# Patient Record
Sex: FEMALE | Race: WHITE | NOT HISPANIC OR LATINO | URBAN - METROPOLITAN AREA
[De-identification: names, ages, dates, MRNs, and addresses within clinical notes are randomized per-mention and may not be internally consistent; named-entity substitution may affect disease eponyms.]

---

## 2017-01-17 DIAGNOSIS — F41.9 ANXIETY: Primary | ICD-10-CM

## 2017-01-17 DIAGNOSIS — M26.609 TMJ (TEMPOROMANDIBULAR JOINT SYNDROME): ICD-10-CM

## 2017-01-17 DIAGNOSIS — R51.9 HEADACHE, UNSPECIFIED HEADACHE TYPE: ICD-10-CM

## 2017-01-18 NOTE — TELEPHONE ENCOUNTER
Routing refill request to provider for review/approval because:  Labs out of range:  PHQ-9 greater than 4.      Leighann Ash RN

## 2017-01-18 NOTE — TELEPHONE ENCOUNTER
SERTRALINE HCL 50MG TABS     Last Written Prescription Date: 10/25/2016  Last Fill Quantity: 45, # refills: 1  Last Office Visit with [facility] primary care provider:  10/25/2016        Last PHQ-9 score on record=   PHQ-9 SCORE 8/22/2016   Total Score -   Total Score 7

## 2017-01-18 NOTE — TELEPHONE ENCOUNTER
left voicemail message for patient to contact TC line to schedule.  NTBS:med check/yearly physical/pap  Candi POTTER

## 2017-01-18 NOTE — TELEPHONE ENCOUNTER
Script refill faxed. Remind pt to do follow up for med check since she is due.  Per chart review also yearly physical/ pap etc ,

## 2017-01-23 ENCOUNTER — OFFICE VISIT (OUTPATIENT)
Dept: FAMILY MEDICINE | Facility: CLINIC | Age: 27
End: 2017-01-23
Payer: COMMERCIAL

## 2017-01-23 VITALS
HEIGHT: 64 IN | DIASTOLIC BLOOD PRESSURE: 70 MMHG | WEIGHT: 148.8 LBS | HEART RATE: 68 BPM | SYSTOLIC BLOOD PRESSURE: 110 MMHG | RESPIRATION RATE: 9 BRPM | TEMPERATURE: 98.9 F | BODY MASS INDEX: 25.4 KG/M2

## 2017-01-23 DIAGNOSIS — R79.0 LOW FERRITIN: ICD-10-CM

## 2017-01-23 DIAGNOSIS — F41.9 ANXIETY: ICD-10-CM

## 2017-01-23 DIAGNOSIS — Z00.00 ROUTINE GENERAL MEDICAL EXAMINATION AT A HEALTH CARE FACILITY: Primary | ICD-10-CM

## 2017-01-23 DIAGNOSIS — Z13.6 CARDIOVASCULAR SCREENING; LDL GOAL LESS THAN 160: ICD-10-CM

## 2017-01-23 DIAGNOSIS — Z23 NEED FOR HPV VACCINE: ICD-10-CM

## 2017-01-23 DIAGNOSIS — Z23 NEED FOR VACCINATION: ICD-10-CM

## 2017-01-23 DIAGNOSIS — R51.9 HEADACHE, UNSPECIFIED HEADACHE TYPE: ICD-10-CM

## 2017-01-23 DIAGNOSIS — E55.9 VITAMIN D DEFICIENCY: ICD-10-CM

## 2017-01-23 DIAGNOSIS — M26.609 TMJ (TEMPOROMANDIBULAR JOINT SYNDROME): ICD-10-CM

## 2017-01-23 PROCEDURE — 90649 4VHPV VACCINE 3 DOSE IM: CPT | Performed by: FAMILY MEDICINE

## 2017-01-23 PROCEDURE — 99395 PREV VISIT EST AGE 18-39: CPT | Mod: 25 | Performed by: FAMILY MEDICINE

## 2017-01-23 PROCEDURE — 99212 OFFICE O/P EST SF 10 MIN: CPT | Mod: 25 | Performed by: FAMILY MEDICINE

## 2017-01-23 PROCEDURE — 90471 IMMUNIZATION ADMIN: CPT | Performed by: FAMILY MEDICINE

## 2017-01-23 ASSESSMENT — PATIENT HEALTH QUESTIONNAIRE - PHQ9: 5. POOR APPETITE OR OVEREATING: NOT AT ALL

## 2017-01-23 ASSESSMENT — ANXIETY QUESTIONNAIRES
6. BECOMING EASILY ANNOYED OR IRRITABLE: NOT AT ALL
1. FEELING NERVOUS, ANXIOUS, OR ON EDGE: SEVERAL DAYS
3. WORRYING TOO MUCH ABOUT DIFFERENT THINGS: NOT AT ALL
5. BEING SO RESTLESS THAT IT IS HARD TO SIT STILL: NOT AT ALL
IF YOU CHECKED OFF ANY PROBLEMS ON THIS QUESTIONNAIRE, HOW DIFFICULT HAVE THESE PROBLEMS MADE IT FOR YOU TO DO YOUR WORK, TAKE CARE OF THINGS AT HOME, OR GET ALONG WITH OTHER PEOPLE: NOT DIFFICULT AT ALL
2. NOT BEING ABLE TO STOP OR CONTROL WORRYING: NOT AT ALL
GAD7 TOTAL SCORE: 1
7. FEELING AFRAID AS IF SOMETHING AWFUL MIGHT HAPPEN: NOT AT ALL

## 2017-01-23 NOTE — PROGRESS NOTES
SUBJECTIVE:     CC: Claudette Pena is an 27 year old woman who presents for preventive health visit.     Healthy Habits:    Do you get at least three servings of calcium containing foods daily (dairy, green leafy vegetables, etc.)? yes    Amount of exercise or daily activities, outside of work: 2 day(s) per week    Problems taking medications regularly No    Medication side effects: No    Have you had an eye exam in the past two years? no    Do you see a dentist twice per year? yes    Do you have sleep apnea, excessive snoring or daytime drowsiness?no        PROBLEMS TO ADD ON...  Follow up on HA/ anxiety med's- her HA have improved a lot since she ha stopped taking oc med's multiple times. Zoloft has helped a lot with mod, not feeling as stressed. Now. Her TMJ is much better. No problem taking med's, so comfortable continuing and wants refill.   She also wants her vit-d and ferritin level bc it was low. She has stopped taking her vit-d last couple of months not taking any iron supplement/ her periods are normal and regular,  But wants labs just to make sure she is ok     Today's PHQ-2 Score:   PHQ-2 ( 1999 [employer]) 8/17/2016 5/16/2016   Q1: Little interest or pleasure in doing things 0 0   Q2: Feeling down, depressed or hopeless 0 0   PHQ-2 Score 0 0       Abuse: Current or Past(Physical, Sexual or Emotional)- NO  Do you feel safe in your environment - YES    Social History   Substance Use Topics     Smoking status: Never Smoker      Smokeless tobacco: Never Used     Alcohol Use: No     The patient does not drink >3 drinks per day nor >7 drinks per week.    Recent Labs   Lab Test  11/17/15   1607  09/20/13   0817  05/10/10   0738   CHOL   --   185  191   HDL   --   67  69   LDL  124  107  108   TRIG   --   59  69   CHOLHDLRATIO   --   2.8  2.8       Reviewed orders with patient.  Reviewed health maintenance and updated orders accordingly - Yes    Mammo Decision Support:  Mammogram not appropriate for this patient  based on age.    Pertinent mammograms are reviewed under the imaging tab.  History of abnormal Pap smear: NO - age 21-29 PAP every 3 years recommended  All Histories reviewed and updated in Epic.  Past Medical History   Diagnosis Date     Anxiety 12/9/2014     Vitamin D deficiency 11/11/2010     TMJ (temporomandibular joint syndrome) 11/5/2014        ROS:  C: NEGATIVE for fever, chills, change in weight  I: NEGATIVE for worrisome rashes, moles or lesions  E: NEGATIVE for vision changes or irritation  ENT: NEGATIVE for ear, mouth and throat problems  R: NEGATIVE for significant cough or SOB  B: NEGATIVE for masses, tenderness or discharge  CV: NEGATIVE for chest pain, palpitations or peripheral edema  GI: NEGATIVE for nausea, abdominal pain, heartburn, or change in bowel habits  : NEGATIVE for unusual urinary or vaginal symptoms. Periods are regular.  M: NEGATIVE for significant arthralgias or myalgia  N: NEGATIVE for weakness, dizziness or paresthesias  NEURO: NEGATIVE for weakness, dizziness or paresthesias and HA, improved as per HPI   ENDOCRINE: NEGATIVE for temperature intolerance, skin/hair changes  HEME/ALLERGY/IMMUNE: NEGATIVE for bleeding problems  PSYCHIATRIC: NEGATIVE for changes in mood or affect and improved on med's, as per HPI     Problem list, Medication list, Allergies, and Medical/Social/Surgical histories reviewed in [facility] and updated as appropriate.  OBJECTIVE:     There were no vitals taken for this visit.  EXAM:  GENERAL: healthy, alert and no distress  EYES: Eyes grossly normal to inspection, PERRL and conjunctivae and sclerae normal  HENT: ear canals and TM's normal, nose and mouth without ulcers or lesions  NECK: no adenopathy, no asymmetry, masses, or scars and thyroid normal to palpation  RESP: lungs clear to auscultation - no rales, rhonchi or wheezes  BREAST: normal without masses, tenderness or nipple discharge and no palpable axillary masses or adenopathy  CV: regular rate and rhythm,  normal S1 S2, no S3 or S4, no murmur, click or rub, no peripheral edema and peripheral pulses strong  ABDOMEN: soft, nontender, no hepatosplenomegaly, no masses and bowel sounds normal   (female): deferred, per pt   RECTAL: deferred  MS: no gross musculoskeletal defects noted, no edema  SKIN: no suspicious lesions or rashes  NEURO: Normal strength and tone, mentation intact and speech normal  PSYCH: mentation appears normal, affect normal/bright    ASSESSMENT/PLAN:     (Z00.00) Routine general medical examination at a health care facility  (primary encounter diagnosis)  Comment: she decline pap/ pelvic   Plan: Glucose, Lipid panel reflex to direct LDL            (F41.9) Anxiety  Comment: doing better on 75 mg dose   Plan: sertraline (ZOLOFT) 50 MG tablet          Discussed cares, talked about signs and symptoms of anxiety/ depression and treatment options. Willing to continue meds for now.  Pros/ cons of med's discussed . spent sometimes counseling patient. Follow up in 4-6  months, sooner if problem.       (R51) Headache, unspecified headache type  Comment: improved and doing better on current med's.   Plan: sertraline (ZOLOFT) 50 MG tablet            (M26.609) TMJ (temporomandibular joint syndrome)  Comment: improved   Plan: sertraline (ZOLOFT) 50 MG tablet, continue with same med's     (Z13.6) CARDIOVASCULAR SCREENING; LDL GOAL LESS THAN 160  Comment:   Plan: Lipid panel reflex to direct LDL            (E55.9) Vitamin D deficiency  Comment:   Plan: Vitamin D Deficiency            (R79.0) Low ferritin  Comment:   Plan: CBC with platelets, Ferritin            (Z23) Need for HPV vaccine  Comment:   Plan: she wish to start series     COUNSELING:   Reviewed preventive health counseling, as reflected in patient instructions       Regular exercise       Healthy diet/nutrition       Vision screening      Patient expressed understanding and agreement with treatment plan. All patient's questions were answered, will let  "me know if has more later.  Medications: Rx's: Reviewed the potential side effects/complications of medications prescribed.        reports that she has never smoked. She has never used smokeless tobacco.    Estimated body mass index is 24.88 kg/(m^2) as calculated from the following:    Height as of 4/6/16: 5' 4\" (1.626 m).    Weight as of 10/25/16: 145 lb (65.772 kg).       HCM issues discussed. Diet/ exercise discussed. Check labs , call patiens with results. follow up as needed.         Counseling Resources:  ATP IV Guidelines  Pooled Cohorts Equation Calculator  Breast Cancer Risk Calculator  FRAX Risk Assessment  ICSI Preventive Guidelines  Dietary Guidelines for Americans, 2010  USDA's MyPlate  ASA Prophylaxis  Lung CA Screening    Eloise De La Torre MD  [facility]  "

## 2017-01-23 NOTE — MR AVS SNAPSHOT
After Visit Summary   1/23/2017    Claudette Pena    MRN: [id number]           Patient Information     Date Of Birth          1990        Visit Information        Provider Department      1/23/2017 4:00 PM Eloise De La Torre MD [facility]        Today's Diagnoses     Routine general medical examination at a health care facility    -  1     Anxiety         Headache, unspecified headache type         TMJ (temporomandibular joint syndrome)         CARDIOVASCULAR SCREENING; LDL GOAL LESS THAN 160         Vitamin D deficiency         Low ferritin         Need for HPV vaccine           Care Instructions      Preventive Health Recommendations  Female Ages 18 to 25     Yearly exam:     See your health care provider every year in order to  o Review health changes.   o Discuss preventive care.    o Review your medicines if your doctor has prescribed any.      You should be tested each year for STDs (sexually transmitted diseases).       After age 20, talk to your provider about how often you should have cholesterol testing.      Starting at age 21, get a Pap test every three years. If you have an abnormal result, your doctor may have you test more often.      If you are at risk for diabetes, you should have a diabetes test (fasting glucose).     Shots:     Get a flu shot each year.     Get a tetanus shot every 10 years.     Consider getting the shot (vaccine) that prevents cervical cancer (Gardasil).    Nutrition:     Eat at least 5 servings of fruits and vegetables each day.    Eat whole-grain bread, whole-wheat pasta and brown rice instead of white grains and rice.    Talk to your provider about Calcium and Vitamin D.     Lifestyle    Exercise at least 150 minutes a week each week (30 minutes a day, 5 days a week). This will help you control your weight and prevent disease.    Limit alcohol to one drink per day.    No smoking.     Wear sunscreen to prevent skin cancer.    See your dentist  "every six months for an exam and cleaning.        Follow-ups after your visit        Future tests that were ordered for you today     Open Future Orders        Priority Expected Expires Ordered    CBC with platelets Routine  9/23/2017 1/23/2017    Vitamin D Deficiency Routine  9/23/2017 1/23/2017    Ferritin Routine  9/23/2017 1/23/2017    Glucose Routine  9/23/2017 1/23/2017    Lipid panel reflex to direct LDL Routine  9/23/2017 1/23/2017            Who to contact     If you have questions or need follow up information about today's clinic visit or your schedule please contact [facility] [place] directly at [phone].  Normal or non-critical lab and imaging results will be communicated to you by MyChart, letter or phone within 4 business days after the clinic has received the results. If you do not hear from us within 7 days, please contact the clinic through [facility] or phone. If you have a critical or abnormal lab result, we will notify you by phone as soon as possible.  Submit refill requests through [employer] or call your pharmacy and they will forward the refill request to us. Please allow 3 business days for your refill to be completed.          Additional Information About Your Visit        MyChart Information     [employer] gives you secure access to your electronic health record. If you see a primary care provider, you can also send messages to your care team and make appointments. If you have questions, please call your primary care clinic.  If you do not have a primary care provider, please call [phone] and they will assist you.        Care EveryWhere ID     This is your Care EveryWhere ID. This could be used by other organizations to access your [facility] medical records  [id number]        Your Vitals Were     Pulse Temperature Respirations Height BMI (Body Mass Index) Last Period    68 98.9  F (37.2  C) (Tympanic) 9 5' 4\" (1.626 m) 25.53 kg/m2 01/07/2017       Blood Pressure from Last 3 " Encounters:   01/23/17 110/70   10/25/16 112/75   08/17/16 100/70    Weight from Last 3 Encounters:   01/23/17 148 lb 12.8 oz (67.495 kg)   10/25/16 145 lb (65.772 kg)   08/17/16 145 lb 12.8 oz (66.134 kg)                 Today's Medication Changes          These changes are accurate as of: 1/23/17  4:40 PM.  If you have any questions, ask your nurse or doctor.               These medicines have changed or have updated prescriptions.        Dose/Directions    sertraline 50 MG tablet   Commonly known as:  ZOLOFT   This may have changed:  See the new instructions.   Used for:  Anxiety, Headache, unspecified headache type   Changed by:  Eloise De La Torre MD        Dose:  75 mg   Take 1.5 tablets (75 mg) by mouth daily   Quantity:  125 tablet   Refills:  1            Where to get your medicines      These medications were [place] [facility] [place] [address] [place] [facility] [address]     Phone:  [phone]    - sertraline 50 MG tablet             Primary Care Provider Office Phone # Fax #    Eloise De La Torre [phone]       [address]  [place] [address]        Thank you!     Thank you for choosing [facility]  [place] your [place]. Our goal is always to provide you with excellent care. Hearing back from our patients is one way we can continue to improve our services. Please take a few minutes to complete the written survey that you may receive in the mail after your visit with us. Thank you!             Your Updated Medication List - Protect others around you: Learn how to safely use, store and throw away your medicines at www.disposemymeds.org.          This list is accurate as of: 1/23/17  4:40 PM.  Always use your most recent med list.                   Brand Name Dispense Instructions for use    Calcium Carb-Cholecalciferol 1000-800 MG-UNIT Tabs    CALCIUM 1000 + D     100 tablet    Take 1 tablet by mouth daily TAKE WITH FOOD, FOR BONE HEALTH AND FOR VITAMIN D SUPPLEMENTATION       cholecalciferol 73208 UNITS capsule    VITAMIN D3    40 capsule    Take 1 capsule (50,000 Units) by mouth every 14 days SIG: TAKE ONE CAP EVERY OTHER WEEK, INDICATION: TO TREAT VITAMIN D DEFICIENCY (1ST AND 15TH OF EACH MONTH), MUST TAKE WITH WITH FAT CONTAINING MEAL       Cyanocobalamin 5000 MCG/ML Liqd    B-12 SUPER STRENGTH    1 Bottle    Place 0.5 mLs under the tongue every morning FOR VITAMIN B12 SUPPLEMENTATION, PLEASE PLACE UNDER THE TONGUE       ferrous fumarate 65 mg ([place]. FE)-Vitamin C 125 mg  MG Tabs tablet    VITRON C    100 tablet    Take 1 tablet by mouth 2 times daily INDICATION: IRON SUPPLEMENT       naproxen 500 MG tablet    NAPROSYN    30 tablet    Take 1 tablet (500 mg) by mouth 2 times daily (with meals)       sertraline 50 MG tablet    ZOLOFT    125 tablet    Take 1.5 tablets (75 mg) by mouth daily

## 2017-01-23 NOTE — NURSING NOTE
"Chief Complaint   Patient presents with     Physical     non fasting       Initial /70 mmHg  Pulse 68  Temp(Src) 98.9  F (37.2  C) (Tympanic)  Resp 9  Ht 5' 4\" (1.626 m)  Wt 148 lb 12.8 oz (67.495 kg)  BMI 25.53 kg/m2  LMP 01/07/2017 Estimated body mass index is 25.53 kg/(m^2) as calculated from the following:    Height as of this encounter: 5' 4\" (1.626 m).    Weight as of this encounter: 148 lb 12.8 oz (67.495 kg).  BP completed using cuff size: regular  "

## 2017-01-24 ASSESSMENT — PATIENT HEALTH QUESTIONNAIRE - PHQ9: SUM OF ALL RESPONSES TO PHQ QUESTIONS 1-9: 0

## 2017-01-24 ASSESSMENT — ANXIETY QUESTIONNAIRES: GAD7 TOTAL SCORE: 1

## 2017-01-27 DIAGNOSIS — E55.9 VITAMIN D DEFICIENCY: ICD-10-CM

## 2017-01-27 DIAGNOSIS — Z13.6 CARDIOVASCULAR SCREENING; LDL GOAL LESS THAN 160: ICD-10-CM

## 2017-01-27 DIAGNOSIS — R79.0 LOW FERRITIN: ICD-10-CM

## 2017-01-27 DIAGNOSIS — Z00.00 ROUTINE GENERAL MEDICAL EXAMINATION AT A HEALTH CARE FACILITY: ICD-10-CM

## 2017-01-27 LAB
CHOLEST SERPL-MCNC: 198 MG/DL
DEPRECATED CALCIDIOL+CALCIFEROL SERPL-MC: 39 UG/L (ref 20–75)
ERYTHROCYTE [DISTWIDTH] IN BLOOD BY AUTOMATED COUNT: 12 % (ref 10–15)
FERRITIN SERPL-MCNC: 29 NG/ML (ref 12–150)
GLUCOSE SERPL-MCNC: 100 MG/DL (ref 70–99)
HCT VFR BLD AUTO: 39.4 % (ref 35–47)
HDLC SERPL-MCNC: 73 MG/DL
HGB BLD-MCNC: 13.3 G/DL (ref 11.7–15.7)
LDLC SERPL CALC-MCNC: 116 MG/DL
MCH RBC QN AUTO: 31.7 PG (ref 26.5–33)
MCHC RBC AUTO-ENTMCNC: 33.8 G/DL (ref 31.5–36.5)
MCV RBC AUTO: 94 FL (ref 78–100)
NONHDLC SERPL-MCNC: 125 MG/DL
PLATELET # BLD AUTO: 207 10E9/L (ref 150–450)
RBC # BLD AUTO: 4.2 10E12/L (ref 3.8–5.2)
TRIGL SERPL-MCNC: 47 MG/DL
WBC # BLD AUTO: 6.8 10E9/L (ref 4–11)

## 2017-01-27 PROCEDURE — 82306 VITAMIN D 25 HYDROXY: CPT | Performed by: FAMILY MEDICINE

## 2017-01-27 PROCEDURE — 36415 COLL VENOUS BLD VENIPUNCTURE: CPT | Performed by: FAMILY MEDICINE

## 2017-01-27 PROCEDURE — 82728 ASSAY OF FERRITIN: CPT | Performed by: FAMILY MEDICINE

## 2017-01-27 PROCEDURE — 82947 ASSAY GLUCOSE BLOOD QUANT: CPT | Performed by: FAMILY MEDICINE

## 2017-01-27 PROCEDURE — 80061 LIPID PANEL: CPT | Performed by: FAMILY MEDICINE

## 2017-01-27 PROCEDURE — 85027 COMPLETE CBC AUTOMATED: CPT | Performed by: FAMILY MEDICINE

## 2017-08-02 ENCOUNTER — TELEPHONE (OUTPATIENT)
Dept: FAMILY MEDICINE | Facility: CLINIC | Age: 27
End: 2017-08-02

## 2017-08-02 NOTE — TELEPHONE ENCOUNTER
Left a message on patient primary contact number [phone] to call back and schedule an appointment with Wil for forms to be completed.       Lizzy Lopez CMA

## 2017-08-02 NOTE — TELEPHONE ENCOUNTER
Nursing Student Immunization/HealthCare Provider forms given to MA to complete then give to Provider to sign  Candi POTTER

## 2017-08-07 ENCOUNTER — OFFICE VISIT (OUTPATIENT)
Dept: FAMILY MEDICINE | Facility: CLINIC | Age: 27
End: 2017-08-07
Payer: COMMERCIAL

## 2017-08-07 ENCOUNTER — RADIANT APPOINTMENT (OUTPATIENT)
Dept: GENERAL RADIOLOGY | Facility: CLINIC | Age: 27
End: 2017-08-07
Attending: FAMILY MEDICINE
Payer: COMMERCIAL

## 2017-08-07 VITALS
WEIGHT: 156 LBS | HEIGHT: 64 IN | BODY MASS INDEX: 26.63 KG/M2 | RESPIRATION RATE: 14 BRPM | OXYGEN SATURATION: 100 % | TEMPERATURE: 98.7 F | DIASTOLIC BLOOD PRESSURE: 71 MMHG | SYSTOLIC BLOOD PRESSURE: 109 MMHG | HEART RATE: 73 BPM

## 2017-08-07 DIAGNOSIS — R76.11 PPD POSITIVE, TREATED: ICD-10-CM

## 2017-08-07 DIAGNOSIS — Z02.89 HEALTH EXAMINATION OF DEFINED SUBPOPULATION: Primary | ICD-10-CM

## 2017-08-07 DIAGNOSIS — R51.9 HEADACHE, UNSPECIFIED HEADACHE TYPE: ICD-10-CM

## 2017-08-07 DIAGNOSIS — F41.9 ANXIETY: ICD-10-CM

## 2017-08-07 PROCEDURE — 71020 XR CHEST 2 VW: CPT

## 2017-08-07 PROCEDURE — 99214 OFFICE O/P EST MOD 30 MIN: CPT | Performed by: FAMILY MEDICINE

## 2017-08-07 ASSESSMENT — ANXIETY QUESTIONNAIRES
3. WORRYING TOO MUCH ABOUT DIFFERENT THINGS: NOT AT ALL
7. FEELING AFRAID AS IF SOMETHING AWFUL MIGHT HAPPEN: NOT AT ALL
1. FEELING NERVOUS, ANXIOUS, OR ON EDGE: NOT AT ALL
GAD7 TOTAL SCORE: 0
6. BECOMING EASILY ANNOYED OR IRRITABLE: NOT AT ALL
IF YOU CHECKED OFF ANY PROBLEMS ON THIS QUESTIONNAIRE, HOW DIFFICULT HAVE THESE PROBLEMS MADE IT FOR YOU TO DO YOUR WORK, TAKE CARE OF THINGS AT HOME, OR GET ALONG WITH OTHER PEOPLE: NOT DIFFICULT AT ALL
5. BEING SO RESTLESS THAT IT IS HARD TO SIT STILL: NOT AT ALL
2. NOT BEING ABLE TO STOP OR CONTROL WORRYING: NOT AT ALL

## 2017-08-07 ASSESSMENT — PATIENT HEALTH QUESTIONNAIRE - PHQ9
SUM OF ALL RESPONSES TO PHQ QUESTIONS 1-9: 1
5. POOR APPETITE OR OVEREATING: NOT AT ALL

## 2017-08-07 NOTE — PROGRESS NOTES
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 Has hx of +ppd, treated with inh for 6 months back in 2007. Feeling well     Physical and mental examination: Has had physical on 1/23/2017, an has all immunization jut need form filled     Checked vision and hearing            PROBLEMS TO ADD ON..    Depression and Anxiety Follow-Up    Status since last visit: Improved and doing well on current med's     Other associated symptoms:See phq-9 and vicky 7    Complicating factors:     Significant life event: No     Current substance abuse: None    PHQ-9 SCORE 8/22/2016 1/23/2017 8/7/2017   Total Score - - -   Total Score 7 0 1     VICKY-7 SCORE 8/22/2016 1/23/2017 8/7/2017   Total Score - - -   Total Score 8 1 0       PHQ-9  English  PHQ-9   Any Language  GAD7        Problem list and histories reviewed & adjusted, as indicated.  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 No family hx of      Lipids No family hx of              Reviewed and updated as needed this visit by clinical staffTobacco  Allergies  Meds       Reviewed and updated as needed this visit by Provider         ROS:  C: NEGATIVE  "for fever, chills, change in weight  I: NEGATIVE for worrisome rashes, moles or lesions  E: NEGATIVE for vision changes or irritation  E/M: NEGATIVE for ear, mouth and throat problems  R: NEGATIVE for significant cough or SOB  B: NEGATIVE for masses, tenderness or discharge  CV: NEGATIVE for chest pain, palpitations or peripheral edema  GI: NEGATIVE for nausea, abdominal pain, heartburn, or change in bowel habits  : NEGATIVE for frequency, dysuria, or hematuria  M: NEGATIVE for significant arthralgias or myalgia  N: NEGATIVE for weakness, dizziness or paresthesias  E: NEGATIVE for temperature intolerance, skin/hair changes  H: NEGATIVE for bleeding problems  P: NEGATIVE for changes in mood or affect    OBJECTIVE:     /71  Pulse 73  Temp 98.7  F (37.1  C) (Tympanic)  Resp 14  Ht 5' 4\" (1.626 m)  Wt 156 lb (70.8 kg)  LMP 07/10/2017 (Approximate)  SpO2 100%  BMI 26.78 kg/m2  Body mass index is 26.78 kg/(m^2).  GENERAL: healthy, alert and no distress  EYES: Eyes grossly normal to inspection, PERRL and conjunctivae and sclerae normal  HENT: ear canals and TM's normal, nose and mouth without ulcers or lesions  NECK: no adenopathy, no asymmetry, masses, or scars and thyroid normal to palpation  RESP: lungs clear to auscultation - no rales, rhonchi or wheezes  CV: regular rate and rhythm, normal S1 S2, no S3 or S4, no murmur, click or rub, no peripheral edema and peripheral pulses strong  ABDOMEN: soft, nontender, no hepatosplenomegaly, no masses and bowel sounds normal  MS: no edema  SKIN: no suspicious lesions or rashes  NEURO: Normal strength and tone, mentation intact and speech normal  PSYCH: mentation appears normal, affect normal/bright        ASSESSMENT/PLAN:     (Z02.89) Health examination of defined subpopulation  (primary encounter diagnosis)  Comment: form completed for school   Plan: EYE EXAM (SIMPLE-NONBILLABLE), SCREENING TEST,         PURE TONE, AIR ONLY, XR Chest 2 Views            (R76.11) " PPD positive, treated  Comment: per pt, treated with inh for 6 months  in 2007 , asymptomatic   Plan: XR Chest 2 Views        Negative chest xray     (F41.9) Anxiety  Comment: improved and stable   Plan: sertraline (ZOLOFT) 50 MG tablet            (R51) Headache, unspecified headache type  Comment: improved   Plan: sertraline (ZOLOFT) 50 MG tablet        Improved and doing well on current med's refill given f/u check in 4-6 months sooner if problem         Form completed   Patient expressed understanding and agreement with treatment plan. All patient's questions were answered, will let me know if has more later.  Medications: Rx's: Reviewed the potential side effects/complications of medications prescribed.     Eloise De La Torre MD  [facility]

## 2017-08-07 NOTE — NURSING NOTE
"Chief Complaint   Patient presents with     Forms       Initial /71  Pulse 73  Temp 98.7  F (37.1  C) (Tympanic)  Ht 5' 4\" (1.626 m)  Wt 156 lb (70.8 kg)  LMP 07/10/2017 (Approximate)  SpO2 100%  BMI 26.78 kg/m2 Estimated body mass index is 26.78 kg/(m^2) as calculated from the following:    Height as of this encounter: 5' 4\" (1.626 m).    Weight as of this encounter: 156 lb (70.8 kg).  Medication Reconciliation: complete  "

## 2017-08-07 NOTE — MR AVS SNAPSHOT
After Visit Summary   8/7/2017    Claudette Pena    MRN: [id number]           Patient Information     Date Of Birth          1990        Visit Information        Provider Department      8/7/2017 3:00 PM Eloise De La Torre MD [facility]        Today's Diagnoses     Routine history and physical examination of adult    -  1    PPD positive, treated        Anxiety        Headache, unspecified headache type          Care Instructions    Check xray             Follow-ups after your visit        Future tests that were ordered for you today     Open Future Orders        Priority Expected Expires Ordered    XR Chest 2 Views Routine 8/7/2017 8/7/2018 8/7/2017            Who to contact     If you have questions or need follow up information about today's clinic visit or your schedule please contact [facility] [place] directly at [phone].  Normal or non-critical lab and imaging results will be communicated to you by MyChart, letter or phone within 4 business days after the clinic has received the results. If you do not hear from us within 7 days, please contact the clinic through MyChart or phone. If you have a critical or abnormal lab result, we will notify you by phone as soon as possible.  Submit refill requests through Memolane or call your pharmacy and they will forward the refill request to us. Please allow 3 business days for your refill to be completed.          Additional Information About Your Visit        MyChart Information     Memolane gives you secure access to your electronic health record. If you see a primary care provider, you can also send messages to your care team and make appointments. If you have questions, please call your primary care clinic.  If you do not have a primary care provider, please call [phone] and they will assist you.        Care EveryWhere ID     This is your Care EveryWhere ID. This could be used by other organizations to access  "your [place] medical records  [id number]        Your Vitals Were     Pulse Temperature Respirations Height Last Period Pulse Oximetry    73 98.7  F (37.1  C) (Tympanic) 14 5' 4\" (1.626 m) 07/10/2017 (Approximate) 100%    BMI (Body Mass Index)                   26.78 kg/m2            Blood Pressure from Last 3 Encounters:   08/07/17 109/71   01/23/17 110/70   10/25/16 112/75    Weight from Last 3 Encounters:   08/07/17 156 lb (70.8 kg)   01/23/17 148 lb 12.8 oz (67.5 kg)   10/25/16 145 lb (65.8 kg)              We Performed the Following     EYE EXAM (SIMPLE-NONBILLABLE)     SCREENING TEST, PURE TONE, AIR ONLY          Where to get your medicines      These medications were sent to [id number] - [place], [place] - [address] AT [facility] [place] & [facility] [address]  [address], [place] [address]    Hours:  24-hours Phone:  [phone]     sertraline 50 MG tablet          Primary Care Provider Office Phone # Fax #    Eloise De La Torre [phone]       [facility] [address] [place] [address] [address]  [place] [address]        Equal Access to Services     [facility]: Hadii aad ku hadasho Soomaali, waaxda luqadaha, qaybta kaalmada adeegyada, liliana henderson . So [facility] [phone].    ATENCIÓN: Si habla español, tiene a curry disposición servicios gratuitos de asistencia lingüística. Llame al [phone].    We comply with applicable federal civil rights laws and [place] laws. We do not discriminate on the basis of race, color, national origin, age, disability sex, sexual orientation or gender identity.            Thank you!     Thank you for choosing [facility] [place]  for your care. Our goal is always to provide you with excellent care. Hearing back from our patients is one way we can continue to improve our services. Please take a few minutes to complete the written survey that you may receive in the mail after your visit with us. " Thank you!             Your Updated Medication List - Protect others around you: Learn how to safely use, store and throw away your medicines at www.disposemymeds.org.          This list is accurate as of: 8/7/17  3:40 PM.  Always use your most recent med list.                   Brand Name Dispense Instructions for use Diagnosis    cholecalciferol 29404 UNITS capsule    VITAMIN D3    40 capsule    Take 1 capsule (50,000 Units) by mouth every 14 days SIG: TAKE ONE CAP EVERY OTHER WEEK, INDICATION: TO TREAT VITAMIN D DEFICIENCY (1ST AND 15TH OF EACH MONTH), MUST TAKE WITH WITH FAT CONTAINING MEAL    Vitamin D deficiency, Chronic fatigue       Cyanocobalamin 5000 MCG/ML Liqd    B-12 SUPER STRENGTH    1 Bottle    Place 0.5 mLs under the tongue every morning FOR VITAMIN B12 SUPPLEMENTATION, PLEASE PLACE UNDER THE TONGUE    Chronic fatigue       ferrous fumarate 65 mg ([place]. FE)-Vitamin C 125 mg  MG Tabs tablet    VITRON C    100 tablet    Take 1 tablet by mouth 2 times daily INDICATION: IRON SUPPLEMENT    Chronic fatigue, History of heavy periods       naproxen 500 MG tablet    NAPROSYN    30 tablet    Take 1 tablet (500 mg) by mouth 2 times daily (with meals)    Headache, unspecified headache type, TMJ (temporomandibular joint syndrome)       sertraline 50 MG tablet    ZOLOFT    125 tablet    Take 1.5 tablets (75 mg) by mouth daily    Anxiety, Headache, unspecified headache type

## 2017-08-08 ASSESSMENT — ANXIETY QUESTIONNAIRES: GAD7 TOTAL SCORE: 0

## 2017-08-28 DIAGNOSIS — R51.9 HEADACHE, UNSPECIFIED HEADACHE TYPE: ICD-10-CM

## 2017-08-28 DIAGNOSIS — F41.9 ANXIETY: ICD-10-CM

## 2017-08-28 NOTE — TELEPHONE ENCOUNTER
Patient has refills remaining with pharmacy.  Christa Jennings RN - Triage  [facility]

## 2017-08-28 NOTE — TELEPHONE ENCOUNTER
SERTRALINE HCL 50MG TABS     Last Written Prescription Date: 8/7/17  Last Fill Quantity: 125, # refills: 1  Last Office Visit with [facility] primary care provider:  8/7/17        Last PHQ-9 score on record=   PHQ-9 SCORE 8/7/2017   Total Score -   Total Score 1             Sarah Severson, [facility]

## 2017-09-01 ENCOUNTER — TELEPHONE (OUTPATIENT)
Dept: INTERNAL MEDICINE | Facility: CLINIC | Age: 27
End: 2017-09-01

## 2017-09-01 DIAGNOSIS — E55.9 VITAMIN D DEFICIENCY: Primary | ICD-10-CM

## 2017-09-01 NOTE — TELEPHONE ENCOUNTER
Calcium 1000 + D 1000-800 tabs      Last Written Prescription Date:  na  Last Fill Quantity: na,   # refills: na  Last Office Visit with G, P or [facility] prescribing provider: 01/12/16  Future Office visit:   0    Routing refill request to provider for review/approval because:  Drug not active on patient's medication list

## 2018-08-01 ENCOUNTER — OFFICE VISIT (OUTPATIENT)
Dept: FAMILY MEDICINE | Facility: CLINIC | Age: 28
End: 2018-08-01
Payer: COMMERCIAL

## 2018-08-01 VITALS
BODY MASS INDEX: 29.19 KG/M2 | HEART RATE: 74 BPM | DIASTOLIC BLOOD PRESSURE: 60 MMHG | HEIGHT: 64 IN | WEIGHT: 171 LBS | SYSTOLIC BLOOD PRESSURE: 108 MMHG | TEMPERATURE: 98.6 F

## 2018-08-01 DIAGNOSIS — Z30.011 ENCOUNTER FOR INITIAL PRESCRIPTION OF CONTRACEPTIVE PILLS: Primary | ICD-10-CM

## 2018-08-01 PROCEDURE — 99213 OFFICE O/P EST LOW 20 MIN: CPT | Performed by: INTERNAL MEDICINE

## 2018-08-01 RX ORDER — NORGESTIMATE AND ETHINYL ESTRADIOL 0.25-0.035
1 KIT ORAL DAILY
Qty: 112 TABLET | Refills: 3 | Status: SHIPPED | OUTPATIENT
Start: 2018-08-01 | End: 2019-07-02

## 2018-08-01 NOTE — PROGRESS NOTES
"  SUBJECTIVE:   Claudette Pena is a 28 year old female who presents to clinic today for the following health issues:      Concern - Pt is here to start birth control pills. Would like one that she can skip her periods. Going out [place], doesn't want to get it when she is on her trip.      She has never been on birth control pill in the past.  She has never been sexually active.  She denies any personal family history of blood clot, she does not smoke, she does not have high blood pressure.        Reviewed and updated as needed this visit by clinical staff  Tobacco  Allergies  Meds  Soc Hx      Reviewed and updated as needed this visit by Provider         ROS:  YVETTE reviewed,  otherwise negative unless noted above.       OBJECTIVE:     /60 (BP Location: Left arm, Patient Position: Chair, Cuff Size: Adult Regular)  Pulse 74  Temp 98.6  F (37  C) (Tympanic)  Ht 5' 4\" (1.626 m)  Wt 171 lb (77.6 kg)  LMP 07/15/2018  BMI 29.35 kg/m2  Body mass index is 29.35 kg/(m^2).    GENERAL: healthy, alert and no distress  PSYCH: mentation appears normal, affect normal/bright        ASSESSMENT/PLAN:       1. Encounter for initial prescription of contraceptive pills  She can certainly start her birth control pill now and skip the placebo pills for 2 months in hopes that this will keep her from getting her period next month.  Counseled that when starting in the middle of her cycle she may be at higher risk for spotting, and there is also an increased risk for spotting when skipping the placebo weeks.  Reviewed other common side effects of OCP.  She expressed understanding.  - norgestimate-ethinyl estradiol (ORTHO-CYCLEN, SPRINTEC) 0.25-35 MG-MCG per tablet; Take 1 tablet by mouth daily Skip the placebo pills for 2 months, take placebo pills on the third month.  Then repeat  Dispense: 112 tablet; Refill: 3    F/U - annually, sooner as needed.     Norma Goode MD  [facility]  "

## 2018-08-01 NOTE — MR AVS SNAPSHOT
"              After Visit Summary   8/1/2018    Claudette Pena    MRN: [id number]           Patient Information     Date Of Birth          1990        Visit Information        Provider Department      8/1/2018 4:40 PM Norma Goode MD [facility]        Today's Diagnoses     Encounter for initial prescription of contraceptive pills    -  1       Follow-ups after your visit        Follow-up notes from your care team     Return in about 1 year (around 8/1/2019) for Physical Exam.      Who to contact     If you have questions or need follow up information about today's clinic visit or your schedule please contact [facility] directly at [phone].  Normal or non-critical lab and imaging results will be communicated to you by MyChart, letter or phone within 4 business days after the clinic has received the results. If you do not hear from us within 7 days, please contact the clinic through Trampolinehart or phone. If you have a critical or abnormal lab result, we will notify you by phone as soon as possible.  Submit refill requests through Mobiplex or call your pharmacy and they will forward the refill request to us. Please allow 3 business days for your refill to be completed.          Additional Information About Your Visit        MyChart Information     Mobiplex gives you secure access to your electronic health record. If you see a primary care provider, you can also send messages to your care team and make appointments. If you have questions, please call your primary care clinic.  If you do not have a primary care provider, please call [phone] and they will assist you.        Care EveryWhere ID     This is your Care EveryWhere ID. This could be used by other organizations to access your [place] medical records  [id number]        Your Vitals Were     Pulse Temperature Height Last Period BMI (Body Mass Index)       74 98.6  F (37  C) (Tympanic) 5' 4\" (1.626 m) 07/15/2018 " 29.35 kg/m2        Blood Pressure from Last 3 Encounters:   08/01/18 108/60   08/07/17 109/71   01/23/17 110/70    Weight from Last 3 Encounters:   08/01/18 171 lb (77.6 kg)   08/07/17 156 lb (70.8 kg)   01/23/17 148 lb 12.8 oz (67.5 kg)              Today, you had the following     No orders found for display         Today's Medication Changes          These changes are accurate as of 8/1/18  4:58 PM.  If you have any questions, ask your nurse or doctor.               Start taking these medicines.        Dose/Directions    norgestimate-ethinyl estradiol 0.25-35 MG-MCG per tablet   Commonly known as:  ORTHO-CYCLEN, SPRINTEC   Used for:  Encounter for initial prescription of contraceptive pills   Started by:  Norma Goode MD        Dose:  1 tablet   Take 1 tablet by mouth daily Skip the placebo pills for 2 months, take placebo pills on the third month.  Then repeat   Quantity:  112 tablet   Refills:  3            Where to get your medicines      These medications were sent to [id number] - [place], [place] - [address] AT [facility] [place] & [facility] [address]  [address], [place] [address]    Hours:  24-hours Phone:  [phone]     norgestimate-ethinyl estradiol 0.25-35 MG-MCG per tablet                Primary Care Provider Office Phone # Fax #    Eloise Cody De La Torre [phone]       [address] [facility] [address] [address]  [place] [address]        Equal Access to Services     [facility] AH: Hadii manuel saucedo Sothelma, waaxda luqadaha, qaybta kaalmaliliana mcbride. So [facility] [phone].    ATENCIÓN: Si habla español, tiene a curry disposición servicios gratuitos de asistencia lingüística. Llame al [phone].    We comply with applicable federal civil rights laws and [place] laws. We do not discriminate on the basis of race, color, national origin, age, disability, sex, sexual orientation, or gender identity.            Thank you!      Thank you for choosing [facility] [place]  for your care. Our goal is always to provide you with excellent care. Hearing back from our patients is one way we can continue to improve our services. Please take a few minutes to complete the written survey that you may receive in the mail after your visit with us. Thank you!             Your Updated Medication List - Protect others around you: Learn how to safely use, store and throw away your medicines at www.disposemymeds.org.          This list is accurate as of 8/1/18  4:58 PM.  Always use your most recent med list.                   Brand Name Dispense Instructions for use Diagnosis    Calcium Carb-Cholecalciferol 1000-800 MG-UNIT Tabs    CALCIUM 1000 + D    100 tablet    Take 1 tablet by mouth daily TAKE WITH FOOD, FOR BONE HEALTH AND FOR VITAMIN D SUPPLEMENTATION    Vitamin D deficiency       cholecalciferol 25200 units capsule    VITAMIN D3    40 capsule    Take 1 capsule (50,000 Units) by mouth every 14 days SIG: TAKE ONE CAP EVERY OTHER WEEK, INDICATION: TO TREAT VITAMIN D DEFICIENCY (1ST AND 15TH OF EACH MONTH), MUST TAKE WITH WITH FAT CONTAINING MEAL    Vitamin D deficiency, Chronic fatigue       Cyanocobalamin 5000 MCG/ML Liqd    B-12 SUPER STRENGTH    1 Bottle    Place 0.5 mLs under the tongue every morning FOR VITAMIN B12 SUPPLEMENTATION, PLEASE PLACE UNDER THE TONGUE    Chronic fatigue       ferrous fumarate 65 mg ([place]. FE)-Vitamin C 125 mg  MG Tabs tablet    VITRON C    100 tablet    Take 1 tablet by mouth 2 times daily INDICATION: IRON SUPPLEMENT    Chronic fatigue, History of heavy periods       naproxen 500 MG tablet    NAPROSYN    30 tablet    Take 1 tablet (500 mg) by mouth 2 times daily (with meals)    Headache, unspecified headache type, TMJ (temporomandibular joint syndrome)       norgestimate-ethinyl estradiol 0.25-35 MG-MCG per tablet    ORTHO-CYCLEN, SPRINTEC    112 tablet    Take 1 tablet by mouth daily Skip the placebo  pills for 2 months, take placebo pills on the third month.  Then repeat    Encounter for initial prescription of contraceptive pills       sertraline 50 MG tablet    ZOLOFT    125 tablet    Take 1.5 tablets (75 mg) by mouth daily    Anxiety, Headache, unspecified headache type

## 2019-06-24 ENCOUNTER — NURSE TRIAGE (OUTPATIENT)
Dept: NURSING | Facility: CLINIC | Age: 29
End: 2019-06-24

## 2019-06-24 NOTE — TELEPHONE ENCOUNTER
"Caller had a brief episode of lightheadedness followed by nausea after  Sitting up  From a bed   Has had several other brief episodes in past week   Feels nauseated at present   Call transferred from appointment line   Triage protocl reviewed   advised in home care and to followup in clinic  Caller understands and will comp     Reason for Disposition    [1] MODERATE dizziness (e.g., interferes with normal activities) AND [2] has NOT been evaluated by physician for this  (Exception: dizziness caused by heat exposure, sudden standing, or poor fluid intake)    Additional Information    Negative: Severe difficulty breathing (e.g., struggling for each breath, speaks in single words)    Negative: [1] Difficulty breathing or swallowing AND [2] started suddenly after medicine, an allergic food or bee sting    Negative: Shock suspected (e.g., cold/pale/clammy skin, too weak to stand, low BP, rapid pulse)    Negative: Difficult to awaken or acting confused (e.g., disoriented, slurred speech)    Negative: [1] Weakness (i.e., paralysis, loss of muscle strength) of the face, arm or leg on one side of the body AND [2] sudden onset AND [3] present now    Negative: [1] Numbness (i.e., loss of sensation) of the face, arm or leg on one side of the body AND [2] sudden onset AND [3] present now    Negative: [1] Loss of speech or garbled speech AND [2] sudden onset AND [3] present now    Negative: Overdose (accidental or intentional) of medications    Negative: [1] Fainted > 15 minutes ago AND [2] still feels too weak or dizzy to stand    Negative: Heart beating < 50 beats per minute OR > 140 beats per minute    Negative: Sounds like a life-threatening emergency to the triager    Negative: Chest pain    Negative: Rectal bleeding, bloody stool, or tarry-black stool    Negative: [1] Vomiting AND [2] contains red blood or black (\"coffee ground\") material    Negative: Vomiting is main symptom    Negative: Diarrhea is main symptom    " "Negative: Headache is main symptom    Negative: Patient states that he/she is having an anxiety/panic attack    Negative: Dizziness from low blood sugar (i.e., < 60 mg/dl or 3.5 mmol/l)    Negative: Dizziness is described as a spinning sensation (i.e., vertigo)    Negative: Heat exhaustion suspected (i.e., dehydration from heat exposure)    Negative: Difficulty breathing    Negative: SEVERE dizziness (e.g., unable to stand, requires support to walk, feels like passing out now)    Negative: Extra heart beats OR irregular heart beating  (i.e., \"palpitations\")    Negative: [1] Drinking very little AND [2] dehydration suspected (e.g., no urine > 12 hours, very dry mouth, very lightheaded)    Negative: Patient sounds very sick or weak to the triager    Negative: [1] Dizziness caused by heat exposure, sudden standing, or poor fluid intake AND [2] no improvement after 2 hours of rest and fluids    Negative: [1] Fever > 103 F (39.4 C) AND [2] not able to get the fever down using Fever Care Advice    Negative: [1] Fever > 101 F (38.3 C) AND [2] age > 60    Negative: [1] Fever > 100.0 F (37.8 C) AND [2] bedridden (e.g., nursing home patient, CVA, chronic illness, recovering from surgery)    Negative: [1] Fever > 100.0 F (37.8 C) AND [2] diabetes mellitus or weak immune system (e.g., HIV positive, cancer chemo, splenectomy, chronic steroids)    Protocols used: DIZZINESS - [id number]      "

## 2019-07-02 ENCOUNTER — OFFICE VISIT (OUTPATIENT)
Dept: FAMILY MEDICINE | Facility: CLINIC | Age: 29
End: 2019-07-02
Payer: COMMERCIAL

## 2019-07-02 VITALS
HEIGHT: 64 IN | BODY MASS INDEX: 31.58 KG/M2 | DIASTOLIC BLOOD PRESSURE: 72 MMHG | HEART RATE: 71 BPM | SYSTOLIC BLOOD PRESSURE: 114 MMHG | OXYGEN SATURATION: 98 % | WEIGHT: 185 LBS | TEMPERATURE: 98.4 F

## 2019-07-02 DIAGNOSIS — R42 DIZZINESS: Primary | ICD-10-CM

## 2019-07-02 DIAGNOSIS — E55.9 VITAMIN D DEFICIENCY: ICD-10-CM

## 2019-07-02 DIAGNOSIS — H81.13 BENIGN PAROXYSMAL POSITIONAL VERTIGO DUE TO BILATERAL VESTIBULAR DISORDER: ICD-10-CM

## 2019-07-02 LAB
ERYTHROCYTE [DISTWIDTH] IN BLOOD BY AUTOMATED COUNT: 13.3 % (ref 10–15)
HCT VFR BLD AUTO: 37 % (ref 35–47)
HGB BLD-MCNC: 12.5 G/DL (ref 11.7–15.7)
MCH RBC QN AUTO: 31.5 PG (ref 26.5–33)
MCHC RBC AUTO-ENTMCNC: 33.8 G/DL (ref 31.5–36.5)
MCV RBC AUTO: 93 FL (ref 78–100)
PLATELET # BLD AUTO: 239 10E9/L (ref 150–450)
RBC # BLD AUTO: 3.97 10E12/L (ref 3.8–5.2)
WBC # BLD AUTO: 7.3 10E9/L (ref 4–11)

## 2019-07-02 PROCEDURE — 99214 OFFICE O/P EST MOD 30 MIN: CPT | Performed by: FAMILY MEDICINE

## 2019-07-02 PROCEDURE — 85027 COMPLETE CBC AUTOMATED: CPT | Performed by: FAMILY MEDICINE

## 2019-07-02 PROCEDURE — 82306 VITAMIN D 25 HYDROXY: CPT | Performed by: FAMILY MEDICINE

## 2019-07-02 PROCEDURE — 36415 COLL VENOUS BLD VENIPUNCTURE: CPT | Performed by: FAMILY MEDICINE

## 2019-07-02 PROCEDURE — 84443 ASSAY THYROID STIM HORMONE: CPT | Performed by: FAMILY MEDICINE

## 2019-07-02 PROCEDURE — 80053 COMPREHEN METABOLIC PANEL: CPT | Performed by: FAMILY MEDICINE

## 2019-07-02 RX ORDER — MECLIZINE HYDROCHLORIDE 25 MG/1
25 TABLET ORAL 3 TIMES DAILY PRN
Qty: 20 TABLET | Refills: 0 | COMMUNITY
Start: 2019-07-02

## 2019-07-02 ASSESSMENT — MIFFLIN-ST. JEOR: SCORE: 1559.15

## 2019-07-02 NOTE — PATIENT INSTRUCTIONS
Patient Education     Dizziness (Vertigo) and Balance Problems: Staying Safe     Replace burned-out light bulbs to keep your home safe and well lit.     Falls or accidents can lead to pain, broken bones, and fear of future falls. Protect yourself and others by preparing for episodes. Simple steps can help you stay safe at home and wherever you go.  Lighting  Keep all areas well lit. This helps your eyes send the right signals to the brain. It also makes you less likely to trip and fall. If bright lights make symptoms worse, dim the lights or lie in a dark room until the dizziness passes. Then turn the lights back to their normal level.  Tips:    Keep a flashlight by the bed.    Place nightlights in bathrooms and hallways.    Replace burned-out bulbs, or have someone replace them for you.  Preventing falls  To reduce your risk of falling:    Get out of bed or up from a chair slowly.    Wear low-heeled shoes that fit properly and have slip-resistant soles.    Remove throw rugs. Clear clutter from walkways.    Use handrails on stairs. Have handrails installed or adjusted if needed.    Install grab bars in the bathroom. Don't use towel racks for balance.    Use a shower stool. Also put adhesive strips in the shower or on the tub floor.  Going out  With a little time and preparation, you can get around safely.  Tips:    Bring a cane or walking aid if needed.    Give yourself plenty of time in case you start to get dizzy.    Ask your healthcare provider what type of exercise is safe for your condition.    Be patient. If an activity such as walking through a crowded shop causes you stress, you may not be ready for it yet.  Driving  If you become dizzy or disoriented while driving, you could hurt yourself and others. That's why it's best to not drive until symptoms have gone away. In some cases, your license may be temporarily held until it's safe for you to drive again.  For safety:    Ask a friend to drive for  you.    Take public transportation.    Walk to stores and other places when you can.  Asking for help  Don't be afraid to ask for help running errands, cooking meals, and doing exercise. Whether it's a friend, loved one, neighbor, or stranger on the street, a little help can make a world of difference.   Date Last Reviewed: 11/1/2016 2000-2018 [employer]. [address] [place] [address], [place] [address]. All rights reserved. This information is not intended as a substitute for professional medical care. Always follow your healthcare professional's instructions.           Patient Education     Benign Paroxysmal Positional Vertigo    Benign paroxysmal positional vertigo is a common condition. You feel as if the room is spinning after changing position, moving your head quickly, or even just rolling over in bed.  Vertigo is a false feeling of motion plus disorientation that makes it seem as though the room is spinning. A vertigo attack may cause sudden nausea, vomiting, and heavy sweating. Severe vertigo causes a loss of balance. You may even fall down.  Vertigo is caused by a problem with the inner ear. The inner ear is located behind the middle ear. It is a part of the balance center of the body. It contains small calcium particles within fluid-filled canals (semi-circular canals). These particles can move out of position. This may happen as a result of aging, head injury, or disease of the inner ear. Once that happens, moving your head in certain ways may cause the particles to stimulate the inner ear. This creates the feeling of vertigo.  An episode of vertigo may last seconds, minutes, or hours. Once you are over the first episode of vertigo, it may never return. Sometimes symptoms return off and on for several weeks or longer.  Home care  Follow these guidelines when caring for yourself at home:    Rest quietly in bed if your symptoms are severe. Change position slowly. There is usually 1 position that  will feel best. This might be lying on 1 side or lying on your back with your head slightly raised on pillows. Until you have no symptoms, you are at a higher risk of falling. Let someone help you when you get up. Get rid of home hazards such as loose electrical cords and throw rugs. Don t walk in unfamiliar areas that are not lighted. Use night lights in bathrooms and kitchen areas.    Do not drive or work with dangerous machinery for 1 week after symptoms go away. This is in case symptoms return suddenly.    Take medicine as prescribed to relieve your symptoms. Unless another medicine was prescribed for nausea, vomiting, and vertigo, you may use over-the-counter motion sickness medicine. Examples of this include meclizine and dimenhydrinate.  Follow-up care  Follow up with your healthcare provider, or as directed. Tell your provider about any ringing in your ear or hearing loss.  If you had a CT or MRI scan, a specialist will review it. You will be told of any new findings that may affect your care.  When to seek medical advice  Call your healthcare provider right away if any of these occur:    Vertigo gets worse even after taking prescribed medicine    Repeated vomiting even after taking prescribed medicine    Weakness that gets worse    Fainting    Severe headache or unusual drowsiness or confusion    Weakness of an arm or leg or 1 side of the face    Trouble walking    Trouble with speech or vision    Seizure    Trouble hearing    Fever of 100.4 F (38 C) or higher, or as directed by your healthcare provider    Fast heart rate    Chest pain   Date Last Reviewed: 11/1/2017 2000-2018 [employer]. [address] [place] [address], [place] [address]. All rights reserved. This information is not intended as a substitute for professional medical care. Always follow your healthcare professional's instructions.

## 2019-07-02 NOTE — PROGRESS NOTES
Subjective     Claudette Pena is a 29 year old female who presents to clinic today for the following health issues:    HPI   Dizziness      Duration: x 1 week- Has been more tired has gained weight, too lately, also had low Hgb and vit-d,  so Wants to get blood work . Also worried about  CBC, thyroid etc     Description   Feeling faint:  no just lightheaded sometimes   Feeling like the surroundings are moving: yes sometimes , when she gets up to move , or even bending head etc , last for few second and goes away although it happened on and off many timed, although it is improving last few days and less frequent and less intense   Loss of consciousness or falls: no     Intensity:  mild,     Accompanying signs and symptoms: no HA or Fatigue   Nausea/vomiting: YES- nausea , no vomiting   Palpitations: no   Weakness in arms or legs: no   Vision or speech changes: no   Ringing in ears (Tinnitus): no   Hearing loss related to dizziness: no   Other (fevers/chills/sweating/dyspnea): no     History (similar episodes/head trauma/previous evaluation/recent bleeding): None    Precipitating or alleviating factors (new med's/chemicals): None  Worse with activity/head movement: no     Therapies tried and outcome: None          Patient Active Problem List   Diagnosis     Vitamin D deficiency     PPD positive, treated     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 No family hx of      Lipids No family hx of              Reviewed and updated as  needed this visit by Provider         Review of Systems   ROS COMP: Constitutional, HEENT, cardiovascular, pulmonary, GI, , musculoskeletal, neuro, skin, endocrine and psych systems are negative, except as otherwise noted.      Objective    There were no vitals taken for this visit.  There is no height or weight on file to calculate BMI.  Physical Exam   GENERAL: healthy, alert and no distress  EYES: Eyes grossly normal to inspection, PERRL and conjunctivae and sclerae normal, no nystagmus   HENT: ear canals and TM's normal, nose and mouth without ulcers or lesions  NECK: no adenopathy, no asymmetry, masses, or scars and thyroid normal to palpation  RESP: lungs clear to auscultation - no rales, rhonchi or wheezes  CV: regular rate and rhythm, normal S1 S2, no S3 or S4, no murmur, click or rub, no peripheral edema   ABDOMEN: soft, nontender, no hepatosplenomegaly, no masses and bowel sounds normal  MS: extremities normal- no gross deformities noted  NEURO: Normal strength and tone, sensory exam grossly normal, mentation intact, cranial nerves 2-12 intact, gait normal including heel/toe/tandem walking, Romberg normal and rapid alternating movements normal  PSYCH: mentation appears normal, affect normal/bright            Assessment & Plan     (R42) Dizziness  (primary encounter diagnosis)  Comment:   Plan: TSH with free T4 reflex, Vitamin D Deficiency,         CBC with platelets, Comprehensive metabolic         panel               (H81.13) Benign paroxysmal positional vertigo due to bilateral vestibular disorder  Comment:   Plan: meclizine (ANTIVERT) 25 MG tablet            Discussed possible differential diagnosis for her symptoms.it sounds like vertigo.         Discussed possible differential diagnosis for her dizziness. It sounds like positional vertigo. Clinically she is doing ok, so reassurance and observation. She declined  Meclizine script as she is improving but willing to try OTC if  needed. Pros/ cons of  "med's discussed.  Talked about precautions, avoiding injuries form falling etc. Talked about warning s/s for which should be seen urgently. Cares and symptomatic treatment discussed follow up if problem. Consider further evaluation if needed.              (E55.9) Vitamin D deficiency  Comment:   Plan: Vitamin D Deficiency   Check labs. Cares and  treatment discussed. follow up if problem   Patient expressed understanding and agreement with treatment plan. All patient's questions were answered, will let me know if has more later.  Medications: Rx's: Reviewed the potential side effects/complications of medications prescribed.     BMI:   Estimated body mass index is 31.76 kg/m  as calculated from the following:    Height as of this encounter: 1.626 m (5' 4\").    Weight as of this encounter: 83.9 kg (185 lb).   Weight management plan: Discussed healthy diet and exercise guidelines    We can check  TSH with free T4 reflex, as well       Eloise De La Torre MD  [facility] [place]        "

## 2019-07-03 LAB
ALBUMIN SERPL-MCNC: 3.4 G/DL (ref 3.4–5)
ALP SERPL-CCNC: 64 U/L (ref 40–150)
ALT SERPL W P-5'-P-CCNC: 16 U/L (ref 0–50)
ANION GAP SERPL CALCULATED.3IONS-SCNC: 9 MMOL/L (ref 3–14)
AST SERPL W P-5'-P-CCNC: 14 U/L (ref 0–45)
BILIRUB SERPL-MCNC: 1 MG/DL (ref 0.2–1.3)
BUN SERPL-MCNC: 10 MG/DL (ref 7–30)
CALCIUM SERPL-MCNC: 8.6 MG/DL (ref 8.5–10.1)
CHLORIDE SERPL-SCNC: 107 MMOL/L (ref 94–109)
CO2 SERPL-SCNC: 23 MMOL/L (ref 20–32)
CREAT SERPL-MCNC: 0.68 MG/DL (ref 0.52–1.04)
DEPRECATED CALCIDIOL+CALCIFEROL SERPL-MC: 55 UG/L (ref 20–75)
GFR SERPL CREATININE-BSD FRML MDRD: >90 ML/MIN/{1.73_M2}
GLUCOSE SERPL-MCNC: 79 MG/DL (ref 70–99)
POTASSIUM SERPL-SCNC: 4.5 MMOL/L (ref 3.4–5.3)
PROT SERPL-MCNC: 7.2 G/DL (ref 6.8–8.8)
SODIUM SERPL-SCNC: 139 MMOL/L (ref 133–144)
TSH SERPL DL<=0.005 MIU/L-ACNC: 1.92 MU/L (ref 0.4–4)

## 2020-02-20 ENCOUNTER — OFFICE VISIT (OUTPATIENT)
Dept: FAMILY MEDICINE | Facility: CLINIC | Age: 30
End: 2020-02-20
Payer: COMMERCIAL

## 2020-02-20 VITALS
DIASTOLIC BLOOD PRESSURE: 70 MMHG | WEIGHT: 171 LBS | TEMPERATURE: 98.1 F | OXYGEN SATURATION: 99 % | SYSTOLIC BLOOD PRESSURE: 100 MMHG | BODY MASS INDEX: 29.19 KG/M2 | HEIGHT: 64 IN | HEART RATE: 68 BPM

## 2020-02-20 DIAGNOSIS — R76.11 PPD POSITIVE, TREATED: ICD-10-CM

## 2020-02-20 DIAGNOSIS — Z00.00 ROUTINE GENERAL MEDICAL EXAMINATION AT A HEALTH CARE FACILITY: Primary | ICD-10-CM

## 2020-02-20 PROCEDURE — 86787 VARICELLA-ZOSTER ANTIBODY: CPT | Performed by: FAMILY MEDICINE

## 2020-02-20 PROCEDURE — 80061 LIPID PANEL: CPT | Performed by: FAMILY MEDICINE

## 2020-02-20 PROCEDURE — 99395 PREV VISIT EST AGE 18-39: CPT | Performed by: FAMILY MEDICINE

## 2020-02-20 PROCEDURE — 82947 ASSAY GLUCOSE BLOOD QUANT: CPT | Performed by: FAMILY MEDICINE

## 2020-02-20 PROCEDURE — 36415 COLL VENOUS BLD VENIPUNCTURE: CPT | Performed by: FAMILY MEDICINE

## 2020-02-20 ASSESSMENT — ANXIETY QUESTIONNAIRES
6. BECOMING EASILY ANNOYED OR IRRITABLE: NOT AT ALL
7. FEELING AFRAID AS IF SOMETHING AWFUL MIGHT HAPPEN: NOT AT ALL
5. BEING SO RESTLESS THAT IT IS HARD TO SIT STILL: NOT AT ALL
1. FEELING NERVOUS, ANXIOUS, OR ON EDGE: NOT AT ALL
IF YOU CHECKED OFF ANY PROBLEMS ON THIS QUESTIONNAIRE, HOW DIFFICULT HAVE THESE PROBLEMS MADE IT FOR YOU TO DO YOUR WORK, TAKE CARE OF THINGS AT HOME, OR GET ALONG WITH OTHER PEOPLE: NOT DIFFICULT AT ALL
3. WORRYING TOO MUCH ABOUT DIFFERENT THINGS: NOT AT ALL
2. NOT BEING ABLE TO STOP OR CONTROL WORRYING: NOT AT ALL
GAD7 TOTAL SCORE: 0

## 2020-02-20 ASSESSMENT — PATIENT HEALTH QUESTIONNAIRE - PHQ9
SUM OF ALL RESPONSES TO PHQ QUESTIONS 1-9: 0
5. POOR APPETITE OR OVEREATING: NOT AT ALL

## 2020-02-20 ASSESSMENT — MIFFLIN-ST. JEOR: SCORE: 1490.65

## 2020-02-20 NOTE — PROGRESS NOTES
SUBJECTIVE:   CC: Claudette Pena is an 30 year old woman who presents for preventive health visit.     Healthy Habits:    Do you get at least three servings of calcium containing foods daily (dairy, green leafy vegetables, etc.)? yes    Amount of exercise or daily activities, outside of work: 0 day(s) per week    Problems taking medications regularly not applicable    Medication side effects: No    Have you had an eye exam in the past two years? no    Do you see a dentist twice per year? yes    Do you have sleep apnea, excessive snoring or daytime drowsiness?no      PROBLEMS TO ADD ON..need  forms completed for school.   need vision and hearing. Has hx of positive ppd over 10 yrs ago, bc of bcg vaccine back home although she was also  treated with med's for long time when she first moved.  she continues to feel well and has no sx of night sweats , fever or chills, cough etc. negative chest xray   - done few months  ago so does not need it  . She is utd on all her immunization    Today's PHQ-2 Score:   PHQ-2 ( 1999 Pfizer) 1/23/2017 8/17/2016   Q1: Little interest or pleasure in doing things 0 0   Q2: Feeling down, depressed or hopeless 0 0   PHQ-2 Score 0 0       Abuse: Current or Past(Physical, Sexual or Emotional)- NO  Do you feel safe in your environment? YES        Social History     Tobacco Use     Smoking status: Never Smoker     Smokeless tobacco: Never Used   Substance Use Topics     Alcohol use: No     Alcohol/week: 0.0 standard drinks     If you drink alcohol do you typically have >3 drinks per day or >7 drinks per week? No                     Reviewed orders with patient.  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 No family hx of      Lipids No family hx of            Mammogram not appropriate for this patient based on age.    Pertinent mammograms are reviewed under the imaging tab.  History of abnormal Pap smear: NO - age 21-29 PAP every 3 years recommended     Reviewed and updated as needed this visit by clinical staff         Reviewed and updated as needed this visit by Provider        Past Medical History:   Diagnosis Date     Anxiety 12/9/2014     TMJ (temporomandibular joint syndrome) 11/5/2014     Vitamin D deficiency 11/11/2010        ROS:  CONSTITUTIONAL: NEGATIVE for fever, chills, change in weight  INTEGUMENTARU/SKIN: NEGATIVE for worrisome rashes, moles or lesions  EYES: NEGATIVE for vision changes or irritation  ENT: NEGATIVE for ear, mouth and throat problems  RESP: NEGATIVE for significant cough or SOB  BREAST: NEGATIVE for masses, tenderness or discharge  CV: NEGATIVE for chest pain, palpitations or peripheral edema  GI: NEGATIVE for nausea, abdominal pain, heartburn, or change in bowel habits  : NEGATIVE for unusual urinary or vaginal symptoms. Periods are regular.  MUSCULOSKELETAL: NEGATIVE for significant arthralgias or myalgia  NEURO: NEGATIVE for weakness, dizziness or paresthesias  ENDOCRINE: NEGATIVE for temperature intolerance, skin/hair changes  PSYCHIATRIC: NEGATIVE for changes in mood or affect    OBJECTIVE:   There were no vitals taken for this visit.  EXAM:  GENERAL: healthy, alert and no distress  EYES: Eyes grossly normal to inspection,  and conjunctivae and sclerae normal  HENT: ear canals and TM's normal, nose and mouth  without ulcers or lesions  NECK: no adenopathy, no asymmetry, masses, or scars and thyroid normal to palpation  RESP: lungs clear to auscultation - no rales, rhonchi or wheezes  BREAST: normal without masses, tenderness or nipple discharge and no palpable axillary masses or adenopathy  CV: regular rate and rhythm, normal S1 S2, no S3 or S4, no murmur, click or rub, no peripheral edema  strong  ABDOMEN: soft, nontender, no hepatosplenomegaly, no masses and bowel sounds normal   (female): deferred  RECTAL: deferred  MS: no gross musculoskeletal defects noted, no edema  SKIN: no suspicious lesions or rashes  NEURO: Normal strength and tone, mentation intact and speech normal  PSYCH: mentation appears normal, affect normal/bright        ASSESSMENT/PLAN:   (Z00.00) Routine general medical examination at a health care facility  (primary encounter diagnosis)  Comment:   Plan: HIV Screening, Lipid panel reflex to direct LDL        Fasting, Glucose, Varicella Zoster Virus         Antibody IgG               (R76.11) PPD positive, treated  Comment:   Plan: per had negative chest xray recently                 (Z68.29) BMI 29.0-29.9,adult  Comment:   Plan: Healthy diet and exercise reviewed.  Limit pop and juice intake.  Risks of obesity discussed.  Encourage exercise.  Limit TV/Computer/game time to one hour a day.    Check labs. Form completed .Cares and  treatment discussed follow. up if problem   Patient expressed understanding and agreement with treatment plan. All patient's questions were answered, will let me know if has more later.  Medications: Rx's: Reviewed the potential side effects/complications of medications prescribed.       COUNSELING:   Reviewed preventive health counseling, as reflected in patient instructions       Regular exercise       Healthy diet/nutrition       Vision screening       Hearing screening       Immunizations    Vaccinated for: Influenza  At work previously         Estimated body mass index  "is 31.76 kg/m  as calculated from the following:    Height as of 7/2/19: 1.626 m (5' 4\").    Weight as of 7/2/19: 83.9 kg (185 lb).    Weight management plan: Discussed healthy diet and exercise guidelines     reports that she has never smoked. She has never used smokeless tobacco.      Counseling Resources:  ATP IV Guidelines  Pooled Cohorts Equation Calculator  Breast Cancer Risk Calculator  FRAX Risk Assessment  ICSI Preventive Guidelines  Dietary Guidelines for Americans, 2010  USDA's MyPlate  ASA Prophylaxis  Lung CA Screening    Eloise De La Torre MD  [facility] [place]  "

## 2020-02-21 LAB
CHOLEST SERPL-MCNC: 222 MG/DL
GLUCOSE SERPL-MCNC: 92 MG/DL (ref 70–99)
HDLC SERPL-MCNC: 63 MG/DL
LDLC SERPL CALC-MCNC: 144 MG/DL
NONHDLC SERPL-MCNC: 159 MG/DL
TRIGL SERPL-MCNC: 74 MG/DL
VZV IGG SER QL IA: 6.5 AI (ref 0–0.8)

## 2020-02-21 ASSESSMENT — ANXIETY QUESTIONNAIRES: GAD7 TOTAL SCORE: 0

## 2020-05-27 ENCOUNTER — NURSE TRIAGE (OUTPATIENT)
Dept: NURSING | Facility: CLINIC | Age: 30
End: 2020-05-27

## 2020-05-27 ENCOUNTER — VIRTUAL VISIT (OUTPATIENT)
Dept: FAMILY MEDICINE | Facility: OTHER | Age: 30
End: 2020-05-27

## 2020-05-27 NOTE — TELEPHONE ENCOUNTER
COVID 19 Nurse Triage Plan/Patient Instructions    Please be aware that novel coronavirus (COVID-19) may be circulating in the community. If you develop symptoms such as fever, cough, or SOB or if you have concerns about the presence of another infection including coronavirus (COVID-19), please contact your health care provider or visit [email or web address].     Disposition/Instructions    Patient to have an [facility] Visit with a provider (Preferred option). Follow System Ambulatory Workflow for COVID 19.     To do this follow these instructions:    1. Go to the website [email or web address]  2. Create an account (you will need your insurance information)  3. Start a new visit  4. Choose your diagnosis (e.g. COVID19)  5. Fill out the information about your symptoms  6. A provider will reach out to you by text, phone call or video visit based on your request    Call Back If: Your symptoms worsen before you are able to complete your [facility] Visit with a provider.    Thank you for limiting contact with others, wearing a simple mask to cover your cough, practice good hand hygiene habits and accessing our virtual services where possible to limit the spread of this virus.    For more information about COVID19 and options for caring for yourself at home, please visit the CDC website at https://www.cdc.gov/coronavirus/2019-ncov/about/steps-when-sick.html  For more options for care at [facility], please visit our website at [email or web address]    For more information, please use the [place] Department of Health COVID-19 Website: [email or web address]/diseases/coronavirus/[email or web address].html  [place] Department of Health ([facility]) COVID-19 Hotlines (Interpreters available):      Health questions: Phone Number: [phone] or 1-912.346.2285 and Hours: 7 a.m. to 7 p.m.    Schools and  questions: Phone Number: [phone] or [phone] and Hours 7 a.m. to 7 p.m.                Reason  for Disposition    [1] COVID-19 suspected (e.g., cough, fever, shortness of breath) AND [2] mild symptoms AND [3] public health department recommends testing    [1] Fever (or feeling feverish) OR cough occurs AND [2] within 14 days of travel from a city or area with major community spread    Additional Information    Negative: SEVERE difficulty breathing (e.g., struggling for each breath, speaks in single words)    Negative: Difficult to awaken or acting confused (e.g., disoriented, slurred speech)    Negative: Bluish (or gray) lips or face now    Negative: Shock suspected (e.g., cold/pale/clammy skin, too weak to stand, low BP, rapid pulse)    Negative: Sounds like a life-threatening emergency to the triager    Negative: SEVERE difficulty breathing (e.g., struggling for each breath, speaks in single words)    Negative: Bluish (or gray) lips or face now    Negative: Sounds like a life-threatening emergency to the triager    Negative: [1] Difficulty breathing occurs AND [2] within 14 days of COVID-19 EXPOSURE (Close Contact)    Negative: Patient sounds very sick or weak to the triager    Protocols used: CORONAVIRUS (COVID-19) DIAGNOSED OR [id number] 4.22.20, CORONAVIRUS (COVID-19) EXPOSURE-A- 4.22.20    Arlin Thomas RN  [facility]

## 2020-05-27 NOTE — PROGRESS NOTES
"Date: 2020 16:31:32  Clinician: Amos Conway  Clinician NPI: [id number]  Patient: Claudette Pena  Patient : 1990  Patient Address: [address] [place] [address]  Patient Phone: [phone]  Visit Protocol: URI  Patient Summary:  Claudette is a 30 year old ( : 1990 ) female who initiated a Visit for COVID-19 (Coronavirus) evaluation and screening. When asked the question \"Please sign me up to receive news, health information and promotions from [facility].\", Claudette responded \"No\".    Claudette states her symptoms started 1-2 days ago.   Her symptoms consist of ageusia, a sore throat, malaise, myalgia, a cough, nasal congestion, rhinitis, and a headache. She is experiencing mild difficulty breathing with activities but can speak normally in full sentences. Claudette also feels feverish but was unable to measure her temperature.   Symptom details     Nasal secretions: The color of her mucus is yellow.    Cough: Claudette coughs a few times an hour and her cough is not more bothersome at night. Phlegm does not come into her throat when she coughs. She does not believe her cough is caused by post-nasal drip.     Sore throat: Claudette reports having moderate throat pain (4-6 on a 10 point pain scale), does not have exudate on her tonsils, and can swallow liquids. The lymph nodes in her neck are not enlarged. A rash has not appeared on the skin since the sore throat started.     Headache: She states the headache is moderate (4-6 on a 10 point pain scale).      Claudette denies having wheezing, nausea, teeth pain, diarrhea, enlarged lymph nodes, anosmia, facial pain or pressure, vomiting, ear pain, and chills. She also denies having recent facial or sinus surgery in the past 60 days, taking antibiotic medication for the symptoms, and having a sinus infection within the past year.   Precipitating events  Within the past week, Claudette has not been exposed to someone with strep throat. She has not recently been exposed to someone " with influenza. Claudette has not been in close contact with any high risk individuals.   Pertinent COVID-19 (Coronavirus) information  In the past 14 days, Claudette has worked in a congregate living setting.   She either works or volunteers as a healthcare worker or a , or works or volunteers in a healthcare facility. She provides direct patient care. 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   Claudette has not lived in a congregate living setting in the past 14 days. She does not live with a healthcare worker.   Claudette has had a close contact with a laboratory-confirmed COVID-19 patient within 14 days of symptom onset. She was not exposed at her work. Additional information about contact with COVID-19 (Coronavirus) patient as reported by the patient (free text): My sister who I live with tested positive for COVID 19.   Pertinent medical history  Claudette does not get yeast infections when she takes antibiotics.   Claudette needs a return to work/school note.   Weight: 170 lbs   Claudette does not smoke or use smokeless tobacco.   She denies pregnancy and denies breastfeeding. She has menstruated in the past month.   Weight: 170 lbs    MEDICATIONS: No current medications, ALLERGIES: NKDA  Clinician Response:  Dear Claudette,      Your symptoms show that you may have coronavirus (COVID-19). This illness can cause fever, cough and trouble breathing. Many people get a mild case and get better on their own. Some people can get very sick.  What should I do?  We would like to test you for this virus. This will be a curbside test done outside the clinic.  Please call [phone] to schedule your visit. Explain that you were [facility] by [facility] to have a COVID-19 test. Be ready to share your [facility] visit ID number.  Starting now:  Stay at least 6 feet away from others. (If someone will drive you to your test, stay in the backseat, as far away from the  as you can.)   Don't go to  "work, school or anywhere else. When it's time for your test, go straight to the testing site. Don't make any stops on the way there or back.   Wash your hands and face often. Use soap and water.   Cover your mouth and nose with a mask, tissue or washcloth.   Don't touch anyone. No hugging, kissing or handshakes.  While at home   Stay home and away from others (self-isolate) until:  You've had no fever---and no medicine that reduces fever---for 3 full days (72 hours). And...  Your other symptoms have gotten better. For example, your cough or breathing has improved. And...  At least 10 days have passed since your symptoms started.  During this time:  Stay in your own room (and use your own bathroom), if you can.  Don't go to work, school or anywhere else.  Stay away from others in your home. No hugging, kissing or shaking hands.  Don't let anyone visit.  Cover your mouth and nose with a mask, tissue or washcloth to avoid spreading germs.  Clean \"high touch\" surfaces often (doorknobs, counters, handles, etc.). Use a household cleaning spray or wipes.  Wash your hands and face often. Use soap and water.  How can I take care of myself?  1. Get lots of rest. Drink extra fluids (unless your doctor has told you not to).  2. Take Tylenol (acetaminophen) for fever or pain. If you have liver or kidney problems, ask your family doctor if it's okay to take Tylenol.  Adults can take either:   650 mg (two 325 mg pills) every 4 to 6 hours, or...  1,000 mg (two 500 mg pills) every 8 hours as needed.   Note: Don't take more than 3,000 mg in one day.   Acetaminophen is found in many medicines (both prescribed and over-the-counter medicines). Read all labels to be sure you don't take too much.   For children, check the Tylenol bottle for the right dose. The dose is based on the child's age or weight.  3. If you have other health problems (like cancer, heart failure, an organ transplant or severe kidney disease): Call your specialty " clinic if you don't feel better in the next 2 days.  4. Know when to call 911: If your breathing is so bad that it keeps you from doing normal activities, call 911 or go to the emergency room. Tell them that you've been staying home and may have COVID-19.  5. Sign up for [employer]. We know it's scary to hear that you might have COVID-19. We want to track your symptoms to make sure you're okay over the next 2 weeks. Please look for an email from [employer]---this is a free, online program that we'll use to keep in touch. To sign up, follow the link in the email. Learn more at [email or web address].  6. The following will serve as your written order for this Covid Test ordered by me for the indication of suspected Covid [Z20.828]: The test will be ordered in [facility], our electronic health record after you are scheduled and will show as ordered and authorized by Cristopher Lagos MD   Order: Covid-19 (Coronavirus) PCR for SYMPTOMATIC testing from [facility]  Where can I get more information?  To learn more about COVID-19 and how to care for yourself at home, please visit the CDC website at https://www.cdc.gov/coronavirus/2019-ncov/about/steps-when-sick.html.  For more about your care at [facility], please visit [email or web address]  If you'd like to be part of a COVID-19 clinical trial (research study) at the [facility], go to [email or web address] for details.    Diagnosis: Other malaise  Diagnosis ICD: R53.81

## 2020-05-28 DIAGNOSIS — Z20.822 SUSPECTED COVID-19 VIRUS INFECTION: Primary | ICD-10-CM

## 2020-05-28 PROCEDURE — 87635 SARS-COV-2 COVID-19 AMP PRB: CPT | Mod: 90 | Performed by: FAMILY MEDICINE

## 2020-05-28 PROCEDURE — 99207 ZZC NO BILLABLE SERVICE THIS VISIT: CPT

## 2020-05-28 PROCEDURE — 99000 SPECIMEN HANDLING OFFICE-LAB: CPT | Performed by: FAMILY MEDICINE

## 2020-05-29 ENCOUNTER — TELEPHONE (OUTPATIENT)
Dept: URGENT CARE | Facility: URGENT CARE | Age: 30
End: 2020-05-29

## 2020-05-29 LAB
SARS-COV-2 RNA SPEC QL NAA+PROBE: ABNORMAL
SPECIMEN SOURCE: ABNORMAL

## 2020-05-29 NOTE — TELEPHONE ENCOUNTER
Coronavirus (COVID-19) Notification    Patient  Claudette Pena    Reason for call  Notify of Positive Coronavirus (COVID-19) lab results, assess symptoms,  review [facility] recommendations    Lab Result    Lab test:  2019-nCoV rRt-PCR or SARS-CoV-2 PCR    Oropharyngeal AND/OR nasopharyngeal swabs is POSITIVE for 2019-nCoV RNA/SARS-COV-2 PCR (COVID-19 virus)    RN Recommendations/Instructions per [facility] Coronavirus COVID-19 recommendations    Brief introduction script  Hi, My name is Michelle and I am calling on behalf of [employer].  We were notified that your Coronavirus test (COVID-19) for was POSITIVE for the virus.  I have some information to relay to you but first I wanted to mention that the [place] Dept of Health will be contacting you shortly [it's possible MD already called Patient] to talk to you more about how you are feeling and other people you have had contact with who might now also have the virus.  Also, [facility] is Partnering with the [facility] for Covid-19 research, you may be contacted directly by research staff.    Assessment (Inquire about Patient's current symptoms)  Headache, low fever, Sx's started Wednesday, no further chest pain or back pain, taking Tylenol every 8 hours, cough and sore throat are getting better. No shortness of breath    If at time of call, Patients symptoms hare worsened, the Patient should contact 911 or have someone drive them to Emergency Dept promptly:      If Patient calling 911, inform 911 personal that you have tested positive for the Coronavirus (COVID-19).  Place mask on and await 911 to arrive.    If Emergency Dept, If possible, please have another adult drive you to the Emergency Dept but you need to wear mask when in contact with other people.      Review information with Patient    Since you tested POSITIVE for the COVID-19 virus, it is important that you protect others from being exposed and infected with this virus.    [For  safety, it's very important to follow these rules.]    First, stay home and away from others (self-isolate) until:    You've had no fever--and no medicine that reduces fever--for 3 full days (72 hours). And      Your other symptoms have gotten better. For example, your cough or breathing has improved. And     At least 10 days have passed since your symptoms started.    [For Asymptomatic Patients] -  If Patient did not have any symptoms when tested or any symptoms since tested, they should self isolate for at least 10 days since their COVID19 PCR test date (date test collected).  New 5/27/20    During this time:    Stay in your own room (and use your own bathroom), if you can.    Stay away from others in your home. No hugging, kissing or shaking hands.    Don't let anyone visit.    Don't go to work, school or anywhere else.     Clean  high touch  surfaces often (doorknobs, counters, handles, etc.). Use a household cleaning spray or wipes.    Cover your mouth and nose with a mask, tissue or washcloth to avoid spreading germs.    Wash your hands and face often with soap and water.    You should not go back to work until you meet the guidelines above for ending your home isolation. You should meet these along with any other guidelines that your employer has.  Employers: This document serves as formal notice of your employee's medical guidelines for going back to work. They must meet the above guidelines before going back to work in person.      How can I take care of myself?  1. Get lots of rest. Drink extra fluids (unless a doctor has told you not to).    2. Take Tylenol (acetaminophen) for fever or pain. If you have liver or kidney problems, ask your family doctor if it's okay to take Tylenol.     Take either:     650 mg (two 325 mg pills) every 4 to 6 hours, or     1,000 mg (two 500 mg pills) every 8 hours as needed.     Note: Don't take more than 3,000 mg in one day. Acetaminophen is found in many medicines (both  prescribed and over-the-counter medicines). Read all labels to be sure you don't take too much.  For children, check the Tylenol bottle for the right dose. The dose is based on the child's age or weight.  3. If you have other health problems (like cancer, heart failure, an organ transplant or severe kidney disease): Call your specialty clinic if you don't feel better in the next 2 days.    4. Know when to call 911: If your breathing is so bad that it keeps you from doing normal activities, call 911 or go to the emergency room. Tell them that you've been staying home and may have COVID-19.    5. Sign up for [employer]. We know it's scary to hear that you have COVID-19. We want to track your symptoms to make sure you're okay over the next 2 weeks. Please look for an email from [employer]--this is a free, online program that we'll use to keep in touch. To sign up, follow the link in the email. Learn more at [email or web address].      Where can I get more information?    To learn the [place]'s guidelines for staying home, please visit the [place] Department of Health website at https://www.health.state.[place]/diseases/coronavirus/basics.html.    To learn more about COVID-19 and how to care for yourself at home, please visit the CDC website at https://www.cdc.gov/coronavirus/2019-ncov/about/steps-when-sick.html.    For more options for care at [facility], please visit our website at [email or web address]      [place] Dept of Health ([facility]) COVID-19 Hotline:   [phone]    Positive COVID-19 letter sent (Yes/No):  Yes- the patient requests result letter released to her [facility].    [Name]  Lynette Nissen RN

## 2020-12-06 ENCOUNTER — HEALTH MAINTENANCE LETTER (OUTPATIENT)
Age: 30
End: 2020-12-06

## 2021-04-11 ENCOUNTER — HEALTH MAINTENANCE LETTER (OUTPATIENT)
Age: 31
End: 2021-04-11

## 2021-09-25 ENCOUNTER — HEALTH MAINTENANCE LETTER (OUTPATIENT)
Age: 31
End: 2021-09-25

## 2022-05-07 ENCOUNTER — HEALTH MAINTENANCE LETTER (OUTPATIENT)
Age: 32
End: 2022-05-07

## 2023-01-07 ENCOUNTER — HEALTH MAINTENANCE LETTER (OUTPATIENT)
Age: 33
End: 2023-01-07

## 2023-06-02 ENCOUNTER — HEALTH MAINTENANCE LETTER (OUTPATIENT)
Age: 33
End: 2023-06-02